# Patient Record
Sex: FEMALE | Race: WHITE | NOT HISPANIC OR LATINO | URBAN - METROPOLITAN AREA
[De-identification: names, ages, dates, MRNs, and addresses within clinical notes are randomized per-mention and may not be internally consistent; named-entity substitution may affect disease eponyms.]

---

## 2023-08-24 ENCOUNTER — WEB ENCOUNTER (OUTPATIENT)
Dept: URBAN - METROPOLITAN AREA CLINIC 54 | Facility: CLINIC | Age: 48
End: 2023-08-24

## 2023-08-29 ENCOUNTER — LAB OUTSIDE AN ENCOUNTER (OUTPATIENT)
Dept: URBAN - METROPOLITAN AREA CLINIC 54 | Facility: CLINIC | Age: 48
End: 2023-08-29

## 2023-08-29 ENCOUNTER — DASHBOARD ENCOUNTERS (OUTPATIENT)
Age: 48
End: 2023-08-29

## 2023-08-29 ENCOUNTER — CLAIMS CREATED FROM THE CLAIM WINDOW (OUTPATIENT)
Dept: URBAN - METROPOLITAN AREA CLINIC 54 | Facility: CLINIC | Age: 48
End: 2023-08-29
Payer: COMMERCIAL

## 2023-08-29 VITALS
DIASTOLIC BLOOD PRESSURE: 86 MMHG | TEMPERATURE: 97.5 F | BODY MASS INDEX: 28.85 KG/M2 | SYSTOLIC BLOOD PRESSURE: 137 MMHG | WEIGHT: 194.8 LBS | HEIGHT: 69 IN | HEART RATE: 71 BPM

## 2023-08-29 DIAGNOSIS — R11.0 NAUSEA: ICD-10-CM

## 2023-08-29 DIAGNOSIS — R10.10 UPPER ABDOMINAL PAIN: ICD-10-CM

## 2023-08-29 DIAGNOSIS — R10.11 RUQ ABDOMINAL PAIN: ICD-10-CM

## 2023-08-29 DIAGNOSIS — R10.12 LUQ ABDOMINAL PAIN: ICD-10-CM

## 2023-08-29 PROCEDURE — 99204 OFFICE O/P NEW MOD 45 MIN: CPT

## 2023-08-29 RX ORDER — CETIRIZINE HYDROCHLORIDE 10 MG/1
1 TABLET TABLET, FILM COATED ORAL ONCE A DAY
Status: ACTIVE | COMMUNITY

## 2023-08-29 NOTE — HPI-TODAY'S VISIT:
8/29/23: Pt is a 48 yo female with PMH of HTN and anxiety/depression who presents on self referral for 9 months of intermittent postprandial abdominal cramping and nausea. Pt states she has upper abd cramping and nausea without vomiting exclusively after eating Mexican food. Typically gets chicken, rice, and queso and avoids spicy salsas. Pain starts within 30 minutes of eating and lasts a few hours before fading. Has had some localized sharp pains in RUQ. No associated changes in bowel habits, hematochezia, melena, or heartburn/reflux. Pt purchased a food allergy test and was positive for some common foods (not with allergist). Pt has not had any imaing done. No prior EGD/cscope. No family hx of CRC.

## 2023-09-20 ENCOUNTER — OFFICE VISIT (OUTPATIENT)
Dept: URBAN - METROPOLITAN AREA CLINIC 53 | Facility: CLINIC | Age: 48
End: 2023-09-20
Payer: COMMERCIAL

## 2023-09-20 DIAGNOSIS — R11.0 NAUSEA: ICD-10-CM

## 2023-09-20 DIAGNOSIS — K80.20 CHOLELITHIASIS: ICD-10-CM

## 2023-09-20 DIAGNOSIS — R10.84 ABDOMINAL PAIN, GENERALIZED: ICD-10-CM

## 2023-09-20 DIAGNOSIS — K76.0 FATTY LIVER: ICD-10-CM

## 2023-09-20 PROCEDURE — 76705 ECHO EXAM OF ABDOMEN: CPT | Performed by: INTERNAL MEDICINE

## 2023-09-22 ENCOUNTER — TELEPHONE ENCOUNTER (OUTPATIENT)
Dept: URBAN - METROPOLITAN AREA CLINIC 54 | Facility: CLINIC | Age: 48
End: 2023-09-22

## 2023-09-22 PROBLEM — 70342003: Status: ACTIVE | Noted: 2023-09-22
